# Patient Record
Sex: FEMALE | Race: WHITE
[De-identification: names, ages, dates, MRNs, and addresses within clinical notes are randomized per-mention and may not be internally consistent; named-entity substitution may affect disease eponyms.]

---

## 2020-03-10 ENCOUNTER — HOSPITAL ENCOUNTER (OUTPATIENT)
Dept: HOSPITAL 95 - LAB SHORT | Age: 55
Discharge: HOME | End: 2020-03-10
Attending: PHYSICIAN ASSISTANT
Payer: COMMERCIAL

## 2020-03-10 DIAGNOSIS — Z12.4: Primary | ICD-10-CM

## 2020-03-10 PROCEDURE — G0123 SCREEN CERV/VAG THIN LAYER: HCPCS

## 2020-03-12 LAB — OTHER STN SPEC: (no result)

## 2021-07-06 ENCOUNTER — HOSPITAL ENCOUNTER (OUTPATIENT)
Dept: HOSPITAL 95 - ORSCSDS | Age: 56
Discharge: HOME | End: 2021-07-06
Attending: ORTHOPAEDIC SURGERY
Payer: COMMERCIAL

## 2021-07-06 VITALS — BODY MASS INDEX: 25.64 KG/M2 | WEIGHT: 135.8 LBS | HEIGHT: 61 IN

## 2021-07-06 DIAGNOSIS — M75.42: ICD-10-CM

## 2021-07-06 DIAGNOSIS — M75.112: Primary | ICD-10-CM

## 2021-07-06 DIAGNOSIS — Z87.891: ICD-10-CM

## 2021-07-06 DIAGNOSIS — M75.32: ICD-10-CM

## 2021-07-06 DIAGNOSIS — M75.22: ICD-10-CM

## 2021-07-06 PROCEDURE — 0LQ24ZZ REPAIR LEFT SHOULDER TENDON, PERCUTANEOUS ENDOSCOPIC APPROACH: ICD-10-PCS | Performed by: ORTHOPAEDIC SURGERY

## 2021-07-06 PROCEDURE — 0RNK4ZZ RELEASE LEFT SHOULDER JOINT, PERCUTANEOUS ENDOSCOPIC APPROACH: ICD-10-PCS | Performed by: ORTHOPAEDIC SURGERY

## 2021-07-06 PROCEDURE — C1713 ANCHOR/SCREW BN/BN,TIS/BN: HCPCS

## 2021-07-06 NOTE — NUR
07/06/21 1423 Artis Torres
1 MG EPI ADDED TO EACH OF THE FIRST 3 BAGS OF LR PER ORDER FOR
IRRIGATION.

## 2023-04-29 ENCOUNTER — HOSPITAL ENCOUNTER (OUTPATIENT)
Dept: HOSPITAL 95 - LAB | Age: 58
End: 2023-04-29
Attending: PHYSICIAN ASSISTANT
Payer: COMMERCIAL

## 2023-04-29 DIAGNOSIS — R30.0: Primary | ICD-10-CM

## 2024-12-17 ENCOUNTER — HOSPITAL ENCOUNTER (OUTPATIENT)
Dept: HOSPITAL 95 - LAB | Age: 59
Discharge: HOME | End: 2024-12-17
Attending: OBSTETRICS & GYNECOLOGY
Payer: COMMERCIAL

## 2024-12-17 DIAGNOSIS — Z01.419: Primary | ICD-10-CM

## 2024-12-17 PROCEDURE — G0123 SCREEN CERV/VAG THIN LAYER: HCPCS
